# Patient Record
Sex: MALE | Race: WHITE | NOT HISPANIC OR LATINO | URBAN - METROPOLITAN AREA
[De-identification: names, ages, dates, MRNs, and addresses within clinical notes are randomized per-mention and may not be internally consistent; named-entity substitution may affect disease eponyms.]

---

## 2023-11-07 ENCOUNTER — OFFICE (OUTPATIENT)
Dept: URBAN - METROPOLITAN AREA CLINIC 26 | Facility: CLINIC | Age: 58
End: 2023-11-07
Payer: COMMERCIAL

## 2023-11-07 VITALS
SYSTOLIC BLOOD PRESSURE: 150 MMHG | HEIGHT: 70 IN | TEMPERATURE: 97.4 F | DIASTOLIC BLOOD PRESSURE: 95 MMHG | WEIGHT: 230 LBS | HEART RATE: 60 BPM

## 2023-11-07 DIAGNOSIS — K64.9 UNSPECIFIED HEMORRHOIDS: ICD-10-CM

## 2023-11-07 DIAGNOSIS — Z12.11 ENCOUNTER FOR SCREENING FOR MALIGNANT NEOPLASM OF COLON: ICD-10-CM

## 2023-11-07 DIAGNOSIS — I10 ESSENTIAL (PRIMARY) HYPERTENSION: ICD-10-CM

## 2023-11-07 PROCEDURE — 99204 OFFICE O/P NEW MOD 45 MIN: CPT | Performed by: INTERNAL MEDICINE

## 2023-12-14 VITALS
DIASTOLIC BLOOD PRESSURE: 78 MMHG | HEART RATE: 59 BPM | OXYGEN SATURATION: 90 % | HEART RATE: 56 BPM | DIASTOLIC BLOOD PRESSURE: 80 MMHG | DIASTOLIC BLOOD PRESSURE: 59 MMHG | HEART RATE: 62 BPM | HEART RATE: 50 BPM | HEART RATE: 61 BPM | HEART RATE: 55 BPM | HEART RATE: 68 BPM | SYSTOLIC BLOOD PRESSURE: 83 MMHG | HEART RATE: 59 BPM | RESPIRATION RATE: 9 BRPM | HEART RATE: 68 BPM | RESPIRATION RATE: 6 BRPM | OXYGEN SATURATION: 94 % | SYSTOLIC BLOOD PRESSURE: 135 MMHG | HEIGHT: 70 IN | SYSTOLIC BLOOD PRESSURE: 83 MMHG | HEART RATE: 60 BPM | HEART RATE: 54 BPM | HEART RATE: 58 BPM | SYSTOLIC BLOOD PRESSURE: 98 MMHG | SYSTOLIC BLOOD PRESSURE: 135 MMHG | SYSTOLIC BLOOD PRESSURE: 99 MMHG | OXYGEN SATURATION: 98 % | WEIGHT: 240 LBS | DIASTOLIC BLOOD PRESSURE: 58 MMHG | RESPIRATION RATE: 5 BRPM | HEART RATE: 67 BPM | DIASTOLIC BLOOD PRESSURE: 48 MMHG | DIASTOLIC BLOOD PRESSURE: 51 MMHG | DIASTOLIC BLOOD PRESSURE: 80 MMHG | SYSTOLIC BLOOD PRESSURE: 107 MMHG | HEART RATE: 65 BPM | DIASTOLIC BLOOD PRESSURE: 48 MMHG | OXYGEN SATURATION: 91 % | SYSTOLIC BLOOD PRESSURE: 83 MMHG | TEMPERATURE: 97.2 F | OXYGEN SATURATION: 90 % | RESPIRATION RATE: 5 BRPM | DIASTOLIC BLOOD PRESSURE: 51 MMHG | SYSTOLIC BLOOD PRESSURE: 114 MMHG | SYSTOLIC BLOOD PRESSURE: 92 MMHG | RESPIRATION RATE: 19 BRPM | DIASTOLIC BLOOD PRESSURE: 88 MMHG | SYSTOLIC BLOOD PRESSURE: 94 MMHG | DIASTOLIC BLOOD PRESSURE: 55 MMHG | SYSTOLIC BLOOD PRESSURE: 88 MMHG | SYSTOLIC BLOOD PRESSURE: 125 MMHG | HEART RATE: 65 BPM | HEART RATE: 62 BPM | OXYGEN SATURATION: 98 % | SYSTOLIC BLOOD PRESSURE: 103 MMHG | RESPIRATION RATE: 10 BRPM | RESPIRATION RATE: 12 BRPM | SYSTOLIC BLOOD PRESSURE: 105 MMHG | SYSTOLIC BLOOD PRESSURE: 105 MMHG | HEART RATE: 64 BPM | HEART RATE: 68 BPM | OXYGEN SATURATION: 90 % | HEART RATE: 61 BPM | RESPIRATION RATE: 6 BRPM | HEART RATE: 50 BPM | DIASTOLIC BLOOD PRESSURE: 88 MMHG | RESPIRATION RATE: 12 BRPM | DIASTOLIC BLOOD PRESSURE: 88 MMHG | WEIGHT: 240 LBS | SYSTOLIC BLOOD PRESSURE: 98 MMHG | RESPIRATION RATE: 10 BRPM | HEART RATE: 55 BPM | DIASTOLIC BLOOD PRESSURE: 51 MMHG | SYSTOLIC BLOOD PRESSURE: 99 MMHG | HEART RATE: 58 BPM | HEART RATE: 54 BPM | OXYGEN SATURATION: 96 % | DIASTOLIC BLOOD PRESSURE: 55 MMHG | RESPIRATION RATE: 7 BRPM | SYSTOLIC BLOOD PRESSURE: 135 MMHG | RESPIRATION RATE: 11 BRPM | SYSTOLIC BLOOD PRESSURE: 98 MMHG | SYSTOLIC BLOOD PRESSURE: 103 MMHG | OXYGEN SATURATION: 96 % | WEIGHT: 240 LBS | HEART RATE: 62 BPM | HEART RATE: 58 BPM | SYSTOLIC BLOOD PRESSURE: 107 MMHG | HEART RATE: 59 BPM | HEIGHT: 70 IN | HEART RATE: 64 BPM | SYSTOLIC BLOOD PRESSURE: 94 MMHG | SYSTOLIC BLOOD PRESSURE: 114 MMHG | RESPIRATION RATE: 9 BRPM | RESPIRATION RATE: 11 BRPM | RESPIRATION RATE: 19 BRPM | HEIGHT: 70 IN | RESPIRATION RATE: 7 BRPM | OXYGEN SATURATION: 94 % | HEART RATE: 54 BPM | DIASTOLIC BLOOD PRESSURE: 61 MMHG | DIASTOLIC BLOOD PRESSURE: 61 MMHG | RESPIRATION RATE: 6 BRPM | DIASTOLIC BLOOD PRESSURE: 80 MMHG | OXYGEN SATURATION: 91 % | RESPIRATION RATE: 9 BRPM | OXYGEN SATURATION: 95 % | HEART RATE: 60 BPM | HEART RATE: 67 BPM | OXYGEN SATURATION: 95 % | RESPIRATION RATE: 11 BRPM | HEART RATE: 65 BPM | DIASTOLIC BLOOD PRESSURE: 78 MMHG | HEART RATE: 67 BPM | OXYGEN SATURATION: 94 % | SYSTOLIC BLOOD PRESSURE: 103 MMHG | SYSTOLIC BLOOD PRESSURE: 88 MMHG | SYSTOLIC BLOOD PRESSURE: 105 MMHG | HEART RATE: 55 BPM | DIASTOLIC BLOOD PRESSURE: 48 MMHG | SYSTOLIC BLOOD PRESSURE: 99 MMHG | RESPIRATION RATE: 5 BRPM | RESPIRATION RATE: 12 BRPM | SYSTOLIC BLOOD PRESSURE: 88 MMHG | RESPIRATION RATE: 7 BRPM | SYSTOLIC BLOOD PRESSURE: 107 MMHG | DIASTOLIC BLOOD PRESSURE: 78 MMHG | SYSTOLIC BLOOD PRESSURE: 125 MMHG | DIASTOLIC BLOOD PRESSURE: 55 MMHG | SYSTOLIC BLOOD PRESSURE: 94 MMHG | OXYGEN SATURATION: 96 % | HEART RATE: 56 BPM | HEART RATE: 61 BPM | SYSTOLIC BLOOD PRESSURE: 92 MMHG | TEMPERATURE: 97.2 F | HEART RATE: 50 BPM | DIASTOLIC BLOOD PRESSURE: 59 MMHG | DIASTOLIC BLOOD PRESSURE: 58 MMHG | HEART RATE: 60 BPM | OXYGEN SATURATION: 91 % | DIASTOLIC BLOOD PRESSURE: 58 MMHG | HEART RATE: 56 BPM | TEMPERATURE: 97.2 F | OXYGEN SATURATION: 98 % | HEART RATE: 64 BPM | RESPIRATION RATE: 19 BRPM | RESPIRATION RATE: 10 BRPM | SYSTOLIC BLOOD PRESSURE: 125 MMHG | DIASTOLIC BLOOD PRESSURE: 59 MMHG | OXYGEN SATURATION: 95 % | SYSTOLIC BLOOD PRESSURE: 114 MMHG | SYSTOLIC BLOOD PRESSURE: 92 MMHG | DIASTOLIC BLOOD PRESSURE: 61 MMHG

## 2023-12-20 PROBLEM — Z12.11 SCREENING FOR COLONIC NEOPLASIA: Status: ACTIVE | Noted: 2023-12-21

## 2023-12-21 ENCOUNTER — AMBULATORY SURGICAL CENTER (OUTPATIENT)
Dept: URBAN - METROPOLITAN AREA SURGERY 12 | Facility: SURGERY | Age: 58
End: 2023-12-21
Payer: COMMERCIAL

## 2023-12-21 ENCOUNTER — OFFICE (OUTPATIENT)
Dept: URBAN - METROPOLITAN AREA PATHOLOGY 2 | Facility: PATHOLOGY | Age: 58
End: 2023-12-21
Payer: COMMERCIAL

## 2023-12-21 DIAGNOSIS — D12.2 BENIGN NEOPLASM OF ASCENDING COLON: ICD-10-CM

## 2023-12-21 DIAGNOSIS — K63.5 POLYP OF COLON: ICD-10-CM

## 2023-12-21 DIAGNOSIS — K64.4 RESIDUAL HEMORRHOIDAL SKIN TAGS: ICD-10-CM

## 2023-12-21 DIAGNOSIS — Z12.11 ENCOUNTER FOR SCREENING FOR MALIGNANT NEOPLASM OF COLON: ICD-10-CM

## 2023-12-21 DIAGNOSIS — D12.3 BENIGN NEOPLASM OF TRANSVERSE COLON: ICD-10-CM

## 2023-12-21 DIAGNOSIS — K64.8 OTHER HEMORRHOIDS: ICD-10-CM

## 2023-12-21 PROBLEM — K63.3 ULCER OF INTESTINE: Status: ACTIVE | Noted: 2023-12-21

## 2023-12-21 PROBLEM — K63.89 OTHER SPECIFIED DISEASES OF INTESTINE: Status: ACTIVE | Noted: 2023-12-21

## 2023-12-21 PROCEDURE — 88305 TISSUE EXAM BY PATHOLOGIST: CPT | Performed by: PATHOLOGY

## 2023-12-21 PROCEDURE — 45385 COLONOSCOPY W/LESION REMOVAL: CPT | Mod: 33 | Performed by: INTERNAL MEDICINE

## 2025-03-20 ENCOUNTER — OFFICE VISIT (OUTPATIENT)
Dept: URGENT CARE | Age: 60
End: 2025-03-20
Payer: COMMERCIAL

## 2025-03-20 VITALS
HEART RATE: 87 BPM | SYSTOLIC BLOOD PRESSURE: 154 MMHG | BODY MASS INDEX: 37.84 KG/M2 | TEMPERATURE: 96.5 F | WEIGHT: 260 LBS | RESPIRATION RATE: 18 BRPM | DIASTOLIC BLOOD PRESSURE: 103 MMHG | OXYGEN SATURATION: 96 %

## 2025-03-20 DIAGNOSIS — M10.9 PODAGRA: Primary | ICD-10-CM

## 2025-03-20 PROCEDURE — 99213 OFFICE O/P EST LOW 20 MIN: CPT | Performed by: PHYSICIAN ASSISTANT

## 2025-03-20 RX ORDER — LISINOPRIL 20 MG/1
1 TABLET ORAL DAILY
COMMUNITY

## 2025-03-20 RX ORDER — DIAZEPAM 2 MG/1
TABLET ORAL
COMMUNITY
Start: 2024-11-07

## 2025-03-20 RX ORDER — LISINOPRIL AND HYDROCHLOROTHIAZIDE 20; 25 MG/1; MG/1
1 TABLET ORAL
COMMUNITY
Start: 2025-02-13

## 2025-03-20 RX ORDER — PREDNISONE 20 MG/1
TABLET ORAL
Qty: 20 TABLET | Refills: 0 | Status: SHIPPED | OUTPATIENT
Start: 2025-03-20 | End: 2025-03-29

## 2025-03-20 RX ORDER — LEVOTHYROXINE SODIUM 150 UG/1
1 TABLET ORAL DAILY
COMMUNITY
Start: 2019-09-20

## 2025-03-20 NOTE — PROGRESS NOTES
Subjective   Patient ID: Uri Ruff is a 60 y.o. male. They present today with a chief complaint of gout    Patient disposition: Home    HISTORY OF PRESENT ILLNESS:    This is an adult male with a PMH of recurrent gout of the feet. He presents for gout of the R foot for the pastd 6 days. He started with pain and swelling only at the R ankle but it has moved more to the base of the R great toe. This is same location as prior gout treated successfully with steroids by his PCP. PCP offered allopurinol Rx previously but pt declined since prior gout flares not as bad. Pain currently 8/10 at worst. Denies f/c/s. Aggravated touching foot at all or weightbearing.        Past Medical History  Allergies as of 03/20/2025 - never reviewed   Allergen Reaction Noted    Penicillins Other and Rash 02/16/2016       (Not in a hospital admission)       No past medical history on file.    No past surgical history on file.     reports that he has been smoking cigarettes. He has never used smokeless tobacco. He reports current alcohol use.    Review of Systems    Negative except as documented in the History of Present Illness.                             Objective    Vitals:    03/20/25 1347   BP: (!) 154/103   Pulse: 87   Resp: 18   Temp: 35.8 °C (96.5 °F)   SpO2: 96%   Weight: 118 kg (260 lb)     No LMP for male patient.      PHYSICAL EXAMINATION:    CONSTITUTIONAL: nontoxic, ambulatory, well-appearing    EYES:  No scleral icterus or orbital trauma noted. No conjunctival injection. PERRLA. EOMI b/l. No nystagmus.    HEENT:  Head and face are unremarkable and atraumatic. Mucous membranes moist. Nares are patent without copious rhinorrhea.  No lymphadenopathy.    CARDIOVASCULAR:  RRR, no m/r/g. Nl S1/S2.     RLE    *Distal pulses intact, capillary refill 1 second in distal digits.    LUNGS:  CTAB, no r/r/w. No dullness to percussion.    ABDOMEN:  Nonobese, nonscaphoid..    SKIN: There is mild swelling at the ankle and also the 1st  MTP with mild erythema and mild increased calor on the R foot. There is no wound. There is no induration and no cellulitic rash.    MUSCULOSKELETAL:     RLE    * ROM is FROM wo pain    * Tenderness is severe with guarding at R 1st MTP    * Soft tissue swelling is as above    Gait is antalgic         NEURO:     RLE    * Motor function is distally intact    * Sensation is distally intact         PSYCH: Appropriate mood and affect.         ---------------------------------------------------------------         MDM:  Prednisone Rx for gout flare of almost 1 wk duration. Pt already tried ibuprofen without much relief. The exam and hx are consistent with gout today. Infxn was considered and is less likely bc exam is not consistent with cellulitis nor deep tissue infxn. DVT was considered and is less likely bc the exam is focal at the foot today. Will fu here PRN and otherwise with his PCP. Advised to avoid NSAIDs while taking prednisone. The potential side effects of corticosteroid use were discussed at length with the patient. These risks include but are not limited to: difficulty sleeping, increased appetite, fluid retention, mood changes, weight gain, change in blood pressure, high blood glucose, possible adrenal suppression, osteoporosis, avascular necrosis of the hip, menstrual irregularities (if applicable), increased risk of infection, and cataracts. The patient understands these risks and is willing to proceed with steroid therapy.          Procedures    Diagnostic study results (if any) were reviewed by Jose Mayer PA-C.    No results found for this visit on 03/20/25.     Assessment/Plan   Allergies, medications, history, and pertinent labs/EKGs/Imaging reviewed by Jose Mayer PA-C.     Orders and Diagnoses  There are no diagnoses linked to this encounter.    Medical Admin Record      Follow Up Instructions  No follow-ups on file.    Electronically signed by Jose Mayer PA-C  2:02 PM

## 2025-06-18 ENCOUNTER — HOSPITAL ENCOUNTER (OUTPATIENT)
Dept: RADIOLOGY | Facility: CLINIC | Age: 60
Discharge: HOME | End: 2025-06-18
Payer: COMMERCIAL

## 2025-06-18 DIAGNOSIS — Z13.6 ENCOUNTER FOR SCREENING FOR CARDIOVASCULAR DISORDERS: ICD-10-CM

## 2025-06-18 PROCEDURE — 75571 CT HRT W/O DYE W/CA TEST: CPT
